# Patient Record
Sex: MALE | Race: WHITE
[De-identification: names, ages, dates, MRNs, and addresses within clinical notes are randomized per-mention and may not be internally consistent; named-entity substitution may affect disease eponyms.]

---

## 2020-12-07 ENCOUNTER — HOSPITAL ENCOUNTER (OUTPATIENT)
Dept: HOSPITAL 19 - COL.RAD | Age: 55
End: 2020-12-07
Payer: COMMERCIAL

## 2020-12-07 VITALS — HEART RATE: 58 BPM | SYSTOLIC BLOOD PRESSURE: 150 MMHG | DIASTOLIC BLOOD PRESSURE: 91 MMHG

## 2020-12-07 VITALS — HEART RATE: 58 BPM | DIASTOLIC BLOOD PRESSURE: 90 MMHG | SYSTOLIC BLOOD PRESSURE: 159 MMHG

## 2020-12-07 VITALS — WEIGHT: 203.27 LBS | HEIGHT: 70 IN | BODY MASS INDEX: 29.1 KG/M2

## 2020-12-07 DIAGNOSIS — R59.0: Primary | ICD-10-CM

## 2024-10-03 ENCOUNTER — HOSPITAL ENCOUNTER (OUTPATIENT)
Dept: HOSPITAL 19 - COL.RAD | Age: 59
End: 2024-10-03
Payer: COMMERCIAL

## 2024-10-03 VITALS — HEART RATE: 63 BPM | DIASTOLIC BLOOD PRESSURE: 90 MMHG | SYSTOLIC BLOOD PRESSURE: 150 MMHG

## 2024-10-03 VITALS — WEIGHT: 198.42 LBS | BODY MASS INDEX: 28.41 KG/M2 | HEIGHT: 70 IN

## 2024-10-03 VITALS — HEART RATE: 61 BPM | SYSTOLIC BLOOD PRESSURE: 153 MMHG | DIASTOLIC BLOOD PRESSURE: 87 MMHG

## 2024-10-03 VITALS — HEART RATE: 64 BPM | DIASTOLIC BLOOD PRESSURE: 89 MMHG | SYSTOLIC BLOOD PRESSURE: 158 MMHG

## 2024-10-03 VITALS — DIASTOLIC BLOOD PRESSURE: 83 MMHG | SYSTOLIC BLOOD PRESSURE: 132 MMHG | HEART RATE: 60 BPM

## 2024-10-03 VITALS — HEART RATE: 62 BPM | DIASTOLIC BLOOD PRESSURE: 94 MMHG | SYSTOLIC BLOOD PRESSURE: 156 MMHG | TEMPERATURE: 98.2 F

## 2024-10-03 VITALS — SYSTOLIC BLOOD PRESSURE: 143 MMHG | DIASTOLIC BLOOD PRESSURE: 86 MMHG | HEART RATE: 61 BPM

## 2024-10-03 VITALS — SYSTOLIC BLOOD PRESSURE: 141 MMHG | DIASTOLIC BLOOD PRESSURE: 90 MMHG | HEART RATE: 65 BPM

## 2024-10-03 VITALS — DIASTOLIC BLOOD PRESSURE: 84 MMHG | SYSTOLIC BLOOD PRESSURE: 149 MMHG | HEART RATE: 62 BPM

## 2024-10-03 VITALS — HEART RATE: 62 BPM | SYSTOLIC BLOOD PRESSURE: 135 MMHG | DIASTOLIC BLOOD PRESSURE: 86 MMHG

## 2024-10-03 DIAGNOSIS — D70.9: ICD-10-CM

## 2024-10-03 DIAGNOSIS — C85.18: Primary | ICD-10-CM

## 2024-10-03 LAB
BASOPHILS NFR BLD MANUAL: 2 % (ref 0–2)
EOSINOPHIL NFR BLD: 1 % (ref 0–4)
ERYTHROCYTE [DISTWIDTH] IN BLOOD BY AUTOMATED COUNT: 12.5 % (ref 11.5–14.5)
HCT VFR BLD AUTO: 41.4 % (ref 42–52)
HGB BLD-MCNC: 14.7 G/DL (ref 13.5–18)
LYMPHOCYTES NFR BLD MANUAL: 30 % (ref 20–51)
MCH RBC QN AUTO: 34 PG (ref 27–31)
MCHC RBC AUTO-ENTMCNC: 36 G/DL (ref 33–37)
MCV RBC AUTO: 97 FL (ref 80–100)
MONOCYTES NFR BLD: 13 % (ref 1.7–9.3)
NEUTS BAND NFR BLD: 1 % (ref 0–10)
NEUTS SEG NFR BLD MANUAL: 53 % (ref 42–75.2)
PLATELET # BLD AUTO: 202 K/MM3 (ref 130–400)
PLATELET BLD QL SMEAR: NORMAL
PMV BLD AUTO: 9.8 FL (ref 7.4–10.4)
RBC # BLD AUTO: 4.28 M/MM3 (ref 4.2–5.6)

## 2024-10-03 PROCEDURE — C1830 POWER BONE MARROW BX NEEDLE: HCPCS

## 2024-10-03 NOTE — NUR
PATIENT COMPLETED HIS RECOVERY PERIOD. BANDAGE REMAINS CLEAN, DRY, AND INTACT.
PATIENT DENIES ANY PAIN. ESCORTED PATIENT WITH ALL OF HIS PERSONAL ITEMS AND
PAPERWORK TO THE PATIENT ENTRANCE WHERE HIS WIFE BROUGHT THEIR VEHICLE.
PATIENT GOT INTO THE FRONT PASSENGER SEAT WITHOUT ANY ISSUES OR ASSISTANCE
WITH ALL OF HIS THINGS. ALL NEEDS MET.